# Patient Record
Sex: FEMALE | Race: BLACK OR AFRICAN AMERICAN | NOT HISPANIC OR LATINO | Employment: PART TIME | URBAN - METROPOLITAN AREA
[De-identification: names, ages, dates, MRNs, and addresses within clinical notes are randomized per-mention and may not be internally consistent; named-entity substitution may affect disease eponyms.]

---
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
## 2021-06-17 ENCOUNTER — ANCILLARY PROCEDURE (OUTPATIENT)
Dept: GENERAL RADIOLOGY | Facility: CLINIC | Age: 21
End: 2021-06-17
Attending: NURSE PRACTITIONER
Payer: COMMERCIAL

## 2021-06-17 ENCOUNTER — OFFICE VISIT (OUTPATIENT)
Dept: FAMILY MEDICINE | Facility: CLINIC | Age: 21
End: 2021-06-17
Payer: COMMERCIAL

## 2021-06-17 VITALS
DIASTOLIC BLOOD PRESSURE: 68 MMHG | OXYGEN SATURATION: 100 % | SYSTOLIC BLOOD PRESSURE: 94 MMHG | HEIGHT: 66 IN | HEART RATE: 77 BPM | WEIGHT: 164 LBS | BODY MASS INDEX: 26.36 KG/M2 | TEMPERATURE: 98.4 F

## 2021-06-17 DIAGNOSIS — S69.91XA INJURY OF FINGER, RIGHT, INITIAL ENCOUNTER: ICD-10-CM

## 2021-06-17 DIAGNOSIS — L50.2 URTICARIA DUE TO COLD: ICD-10-CM

## 2021-06-17 DIAGNOSIS — Z11.3 SCREENING FOR STDS (SEXUALLY TRANSMITTED DISEASES): ICD-10-CM

## 2021-06-17 DIAGNOSIS — D72.829 LEUKOCYTOSIS, UNSPECIFIED TYPE: ICD-10-CM

## 2021-06-17 DIAGNOSIS — G89.29 CHRONIC BILATERAL LOW BACK PAIN WITHOUT SCIATICA: ICD-10-CM

## 2021-06-17 DIAGNOSIS — Z30.09 GENERAL COUNSELING AND ADVICE FOR CONTRACEPTIVE MANAGEMENT: ICD-10-CM

## 2021-06-17 DIAGNOSIS — M54.50 CHRONIC BILATERAL LOW BACK PAIN WITHOUT SCIATICA: ICD-10-CM

## 2021-06-17 DIAGNOSIS — Z00.00 ROUTINE GENERAL MEDICAL EXAMINATION AT A HEALTH CARE FACILITY: Primary | ICD-10-CM

## 2021-06-17 LAB
BASOPHILS # BLD AUTO: 0 10E9/L (ref 0–0.2)
BASOPHILS NFR BLD AUTO: 0.2 %
DIFFERENTIAL METHOD BLD: NORMAL
EOSINOPHIL # BLD AUTO: 0.5 10E9/L (ref 0–0.7)
EOSINOPHIL NFR BLD AUTO: 6.2 %
ERYTHROCYTE [DISTWIDTH] IN BLOOD BY AUTOMATED COUNT: 13.2 % (ref 10–15)
HCT VFR BLD AUTO: 39.6 % (ref 35–47)
HGB BLD-MCNC: 13.4 G/DL (ref 11.7–15.7)
LYMPHOCYTES # BLD AUTO: 2.7 10E9/L (ref 0.8–5.3)
LYMPHOCYTES NFR BLD AUTO: 31.5 %
MCH RBC QN AUTO: 30.7 PG (ref 26.5–33)
MCHC RBC AUTO-ENTMCNC: 33.8 G/DL (ref 31.5–36.5)
MCV RBC AUTO: 91 FL (ref 78–100)
MONOCYTES # BLD AUTO: 0.6 10E9/L (ref 0–1.3)
MONOCYTES NFR BLD AUTO: 6.8 %
NEUTROPHILS # BLD AUTO: 4.8 10E9/L (ref 1.6–8.3)
NEUTROPHILS NFR BLD AUTO: 55.3 %
PLATELET # BLD AUTO: 281 10E9/L (ref 150–450)
RBC # BLD AUTO: 4.37 10E12/L (ref 3.8–5.2)
WBC # BLD AUTO: 8.7 10E9/L (ref 4–11)

## 2021-06-17 PROCEDURE — 36415 COLL VENOUS BLD VENIPUNCTURE: CPT | Performed by: NURSE PRACTITIONER

## 2021-06-17 PROCEDURE — 87491 CHLMYD TRACH DNA AMP PROBE: CPT | Performed by: NURSE PRACTITIONER

## 2021-06-17 PROCEDURE — 73140 X-RAY EXAM OF FINGER(S): CPT | Mod: RT | Performed by: RADIOLOGY

## 2021-06-17 PROCEDURE — 99395 PREV VISIT EST AGE 18-39: CPT | Performed by: NURSE PRACTITIONER

## 2021-06-17 PROCEDURE — 87591 N.GONORRHOEAE DNA AMP PROB: CPT | Performed by: NURSE PRACTITIONER

## 2021-06-17 PROCEDURE — 85025 COMPLETE CBC W/AUTO DIFF WBC: CPT | Performed by: NURSE PRACTITIONER

## 2021-06-17 PROCEDURE — 99213 OFFICE O/P EST LOW 20 MIN: CPT | Mod: 25 | Performed by: NURSE PRACTITIONER

## 2021-06-17 RX ORDER — CETIRIZINE HYDROCHLORIDE 10 MG/1
10 TABLET ORAL EVERY EVENING
Qty: 90 TABLET | Refills: 3 | Status: SHIPPED | OUTPATIENT
Start: 2021-06-17

## 2021-06-17 RX ORDER — EPINEPHRINE 0.3 MG/.3ML
0.3 INJECTION SUBCUTANEOUS PRN
Qty: 0.6 ML | Refills: 3 | Status: SHIPPED | OUTPATIENT
Start: 2021-06-17 | End: 2024-05-06

## 2021-06-17 RX ORDER — CETIRIZINE HYDROCHLORIDE 10 MG/1
10 TABLET ORAL EVERY EVENING
Qty: 90 TABLET | Refills: 3 | Status: CANCELLED | OUTPATIENT
Start: 2021-06-17

## 2021-06-17 RX ORDER — EPINEPHRINE 0.3 MG/.3ML
0.3 INJECTION SUBCUTANEOUS PRN
Qty: 0.6 ML | Refills: 3 | Status: CANCELLED | OUTPATIENT
Start: 2021-06-17

## 2021-06-17 ASSESSMENT — ENCOUNTER SYMPTOMS
DIARRHEA: 0
HEMATURIA: 0
CONSTIPATION: 1
COUGH: 0
SORE THROAT: 0
PALPITATIONS: 0
HEADACHES: 0
DIZZINESS: 0
HEARTBURN: 0
EYE PAIN: 0
WEAKNESS: 0
FREQUENCY: 0
MYALGIAS: 1
HEMATOCHEZIA: 0
NERVOUS/ANXIOUS: 0
ABDOMINAL PAIN: 0
CHILLS: 1
ARTHRALGIAS: 1
FEVER: 0
PARESTHESIAS: 0
NAUSEA: 0
DYSURIA: 0
JOINT SWELLING: 0
BREAST MASS: 0
SHORTNESS OF BREATH: 1

## 2021-06-17 ASSESSMENT — MIFFLIN-ST. JEOR: SCORE: 1526.65

## 2021-06-17 NOTE — PROGRESS NOTES
SUBJECTIVE:   CC: Keshia Orellana is an 21 year old woman who presents for preventive health visit.       Patient has been advised of split billing requirements and indicates understanding: Yes  Healthy Habits:     Getting at least 3 servings of Calcium per day:  NO    Bi-annual eye exam:  NO    Dental care twice a year:  NO    Sleep apnea or symptoms of sleep apnea:  None    Diet:  Other    Frequency of exercise:  2-3 days/week    Duration of exercise:  Less than 15 minutes    Taking medications regularly:  Yes    Medication side effects:  None    PHQ-2 Total Score: 0    Additional concerns today:  Yes (birth control, right ring finger)    -Punched wall over 6 months ago and finger is bent- has pain when bumping it.  -White blood cell count elevated in Emergency Room months ago, worried as father as leukemia  -Low back painful for months, mostly when bending over      Today's PHQ-2 Score:   PHQ-2 ( 1999 Pfizer) 6/17/2021   Q1: Little interest or pleasure in doing things 0   Q2: Feeling down, depressed or hopeless 0   PHQ-2 Score 0   Q1: Little interest or pleasure in doing things Not at all   Q2: Feeling down, depressed or hopeless Not at all   PHQ-2 Score 0       Abuse: Current or Past (Physical, Sexual or Emotional) - No  Do you feel safe in your environment? Yes    Have you ever done Advance Care Planning? (For example, a Health Directive, POLST, or a discussion with a medical provider or your loved ones about your wishes): No, advance care planning information given to patient to review.  Patient declined advance care planning discussion at this time.    Social History     Tobacco Use     Smoking status: Never Smoker     Smokeless tobacco: Never Used   Substance Use Topics     Alcohol use: No     Alcohol/week: 0.0 standard drinks     If you drink alcohol do you typically have >3 drinks per day or >7 drinks per week? No    Alcohol Use 6/17/2021   Prescreen: >3 drinks/day or >7 drinks/week? Not Applicable  "      Reviewed orders with patient.  Reviewed health maintenance and updated orders accordingly - Yes  Labs reviewed in EPIC    Breast Cancer Screening:        History of abnormal Pap smear: NO - under age 21, PAP not appropriate for age     Reviewed and updated as needed this visit by clinical staff  Tobacco  Allergies  Meds   Med Hx  Surg Hx  Fam Hx  Soc Hx        Reviewed and updated as needed this visit by Provider                    Review of Systems   Constitutional: Positive for chills. Negative for fever.   HENT: Positive for hearing loss. Negative for congestion, ear pain and sore throat.    Eyes: Positive for visual disturbance. Negative for pain.   Respiratory: Positive for shortness of breath. Negative for cough.    Cardiovascular: Negative for chest pain, palpitations and peripheral edema.   Gastrointestinal: Positive for constipation. Negative for abdominal pain, diarrhea, heartburn, hematochezia and nausea.   Breasts:  Negative for tenderness, breast mass and discharge.   Genitourinary: Negative for dysuria, frequency, genital sores, hematuria, pelvic pain, urgency, vaginal bleeding and vaginal discharge.   Musculoskeletal: Positive for arthralgias and myalgias. Negative for joint swelling.   Skin: Negative for rash.   Neurological: Negative for dizziness, weakness, headaches and paresthesias.   Psychiatric/Behavioral: Negative for mood changes. The patient is not nervous/anxious.         OBJECTIVE:   BP 94/68 (BP Location: Right arm, Patient Position: Chair, Cuff Size: Adult Regular)   Pulse 77   Temp 98.4  F (36.9  C) (Oral)   Ht 1.67 m (5' 5.75\")   Wt 74.4 kg (164 lb)   SpO2 100%   Breastfeeding No   BMI 26.67 kg/m    Physical Exam  GENERAL: healthy, alert and no distress  EYES: Eyes grossly normal to inspection, PERRL and conjunctivae and sclerae normal  HENT: ear canals and TM's normal, nose and mouth without ulcers or lesions  NECK: no adenopathy, no asymmetry, masses, or scars and " thyroid normal to palpation  RESP: lungs clear to auscultation - no rales, rhonchi or wheezes  BREAST: normal without masses, tenderness or nipple discharge and no palpable axillary masses or adenopathy  CV: regular rate and rhythm, normal S1 S2, no S3 or S4, no murmur, click or rub, no peripheral edema and peripheral pulses strong  ABDOMEN: soft, nontender, no hepatosplenomegaly, no masses and bowel sounds normal  MS: Right 4th finger flexed at DIP, unable to actively extend DIP, can move into proper alignment with passive ROM  Tenderness lumbar spine and bilateral Paraspinous muscles  SKIN: no suspicious lesions or rashes  NEURO: Normal strength and tone, mentation intact and speech normal  PSYCH: mentation appears normal, affect normal/bright    Diagnostic Test Results:  Labs reviewed in Epic  No results found for this or any previous visit (from the past 24 hour(s)).    ASSESSMENT/PLAN:   1. Routine general medical examination at a health care facility      2. Screening for STDs (sexually transmitted diseases)    - NEISSERIA GONORRHOEA PCR  - CHLAMYDIA TRACHOMATIS PCR    3. Injury of finger, right, initial encounter  Old fracture vs tendon injury- plain films today and follow up with ortho  - XR Finger Right G/E 2 Views; Future  - Orthopedic  Referral; Future    4. Leukocytosis, unspecified type    - CBC with platelets and differential    5. Chronic bilateral low back pain without sciatica    - [facility] REFERR[facility]; Future    6. Urticaria due to cold    - EPINEPHrine (ANY BX GENERIC EQUIV) 0.3 MG/0.3ML injection 2-pack; Inject 0.3 mLs (0.3 mg) into the muscle as needed for anaphylaxis  Dispense: 0.6 mL; Refill: 3  - cetirizine (ZYRTEC) 10 MG tablet; Take 1 tablet (10 mg) by mouth every evening  Dispense: 90 tablet; Refill: 3    7. General counseling and advice for contraceptive management  Patient is interested in proceeding with Mirena IUD insertion. Counseled patient regarding risks of Mirena  "insertion including perforation of uterus, migration of IUD outside of the uterus requiring surgical removal, and infection. Additional complications can include the IUD adhering to the wall of the uterus, spontaneous expulsion of the IUD, pelvic pain or cramping, and irregular menstrual bleeding pattern. Advantages of the IUD include 7 years of contraception, lighter periods, and immediate return to fertility after removal. Patient will follow up in clinic around the time of her next menses for IUD insertion. She is instructed to take 600 mg of Ibuprofen 30 minutes prior to her scheduled appointment.      Patient has been advised of split billing requirements and indicates understanding: No  COUNSELING:  Reviewed preventive health counseling, as reflected in patient instructions       Regular exercise       Healthy diet/nutrition       Contraception       Osteoporosis prevention/bone health       Consider Hep C screening for all patients one time for ages 18-79 years       HIV screeninx in teen years, 1x in adult years, and at intervals if high risk    Estimated body mass index is 26.67 kg/m  as calculated from the following:    Height as of this encounter: 1.67 m (5' 5.75\").    Weight as of this encounter: 74.4 kg (164 lb).    Weight management plan: Discussed healthy diet and exercise guidelines    She reports that she has never smoked. She has never used smokeless tobacco.      Counseling Resources:  ATP IV Guidelines  Pooled Cohorts Equation Calculator  Breast Cancer Risk Calculator  BRCA-Related Cancer Risk Assessment: FHS-7 Tool  FRAX Risk Assessment  ICSI Preventive Guidelines  Dietary Guidelines for Americans,   USDA's MyPlate  ASA Prophylaxis  Lung CA Screening    ERON [facility]  "

## 2021-06-17 NOTE — PATIENT INSTRUCTIONS
We are now scheduling all people age 12 and older for COVID-19 vaccines (patients age 12-17 can only  the Pfizer vaccine).     To schedule your appointment please log in to "[employer] using this link to see when and where we have openings. Appointments open up throughout the week, check back for additional openings.     If there are no appointments left, you will be unable to schedule. If you have technical difficulty using "[employer] call [phone] for assistance.  If you would like to be added to our standby list, do that on our website: here.    Patients 12-17 years old must schedule their appointment at a location offering the Pfizer vaccine.  First dose Pfizer vaccines are available at the following sites with convenient hours, including Saturday appointments:    [facility] (former clinic, now serving as a vaccination-only site)    [facility]    Appointments for  Moderna and Edgardo (Abdelrahman& Abdelrahman) COVID-19 Vaccines for those 18 years and older is available at many locations in our system.  More information is on our website: [email or web address]. Check this website to stay up to date on COVID-19 vaccination information      Preventive Health Recommendations  Female Ages 18 to 20     Yearly exam:     See your health care provider every year in order to  o Review health changes.   o Discuss preventive care.    o Review your medicines if your doctor has prescribed any.      You should be tested each year for STDs (sexually transmitted diseases).       After age 20, talk to your provider about how often you should have cholesterol testing.      If you are at risk for diabetes, you should have a diabetes test (fasting glucose).     Shots:     Get a flu shot each year.     Get a tetanus shot every 10 years.     Consider getting the shot  (vaccine) that prevents cervical cancer (Gardasil).    Nutrition:     Eat at least 5 servings of fruits and vegetables each day.    Eat whole-grain bread, whole-wheat pasta and brown rice instead of white grains and rice.    Get adequate Calcium and Vitamin D.     Lifestyle    Exercise at least 150 minutes a week each week (30 minutes a day, 5 days a week). This will help you control your weight and prevent disease.    No smoking.     Wear sunscreen to prevent skin cancer.    See your dentist every six months for an exam and cleaning.

## 2021-06-21 ENCOUNTER — TELEPHONE (OUTPATIENT)
Dept: FAMILY MEDICINE | Facility: CLINIC | Age: 21
End: 2021-06-21

## 2021-06-21 NOTE — TELEPHONE ENCOUNTER
Called patient. Left voice message to return call at [phone].      Written by Jenifer Grady APRN CNP on 6/18/2021  4:12 PM  Nima Schmitt,   Here are your recent results.   -Chlamydia and gonnohrea tests are normal.       Jenifer Grady, CNP

## 2021-06-22 NOTE — PROGRESS NOTES
SUBJECTIVE:   Keshia Orellana is a 21 year old female who is seen in consultation at the request of Jenifer Grady CNP for evaluation of right IV digit  finger pain.   History of injury: punched a wall 6 months ago.  Hasn't been able to straighten end of the finger since.  Pain since.  Impairs her bowling.  Pain at DIP  She had an xray last week. Bumped it on a door a couple of days ago (after the xray) with increased pain.    Treatments tried to this point: none    Review of Systems:  Constitutional:  NEGATIVE for fever, chills, change in weight  Integumentary/Skin:  NEGATIVE for worrisome rashes, moles or lesions  Eyes:  NEGATIVE for vision changes or irritation  ENT/Mouth:  NEGATIVE for ear, mouth and throat problems  Resp:  NEGATIVE for significant cough or SOB  Breast:  NEGATIVE for masses, tenderness or discharge  CV:  NEGATIVE for chest pain, palpitations or peripheral edema  GI:  NEGATIVE for nausea, abdominal pain, heartburn, or change in bowel habits  :  Negative   Musculoskeletal:  See HPI above  Neuro:  NEGATIVE for weakness, dizziness or paresthesias  Endocrine:  NEGATIVE for temperature intolerance, skin/hair changes  Heme/allergy/immune:  NEGATIVE for bleeding problems  Psychiatric:  NEGATIVE for changes in mood or affect    Past Medical History:   Past Medical History:   Diagnosis Date     Chlamydia 2018, 2018, 2019     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     Past Surgical History:   Past Surgical History:   Procedure Laterality Date     HC INSERT IMPLANTABLE CONTRACEPTIVE CAPSULE      Nexplanon     HC REMOVAL IMPLATABLE CONTRACEPTIVE CAPSULE       Family History:   Family History   Problem Relation Age of Onset     Family History Negative Other      Leukemia Father      Diabetes No family hx of      Coronary Artery Disease No family hx of      Hypertension No family hx of      Breast Cancer No family hx of      Colon Cancer No family hx of      Social History:   Social  "History     Tobacco Use     Smoking status: Never Smoker     Smokeless tobacco: Never Used   Substance Use Topics     Alcohol use: No     Alcohol/week: 0.0 standard drinks       OBJECTIVE:  Physical Exam:  BP 95/61 (BP Location: Left arm, Patient Position: Sitting, Cuff Size: Adult Regular)   Pulse 86   Ht 1.721 m (5' 7.75\")   Wt 74.4 kg (164 lb)   LMP 06/01/2021 (Approximate)   SpO2 100%   BMI 25.12 kg/m    General Appearance: healthy, alert and no distress   Skin: no suspicious lesions or rashes  Neuro: Normal strength and tone, mentation intact and speech normal  Vascular: good pulses, and cappillary refill   Lymph: no lymphadenopathy   Psych:  mentation appears normal and affect normal/bright  Resp: no increased work of breathing     Right Hand Exam:  Inspection: some swelling of DIP.  Early swan-neck deformity  ROM: able to make a full fist  Right 4th finger flexed at DIP, unable to actively fully extend DIP, can move into proper alignment with passive ROM  Tender: mildly at DIP.     X-rays:  Obtained 6/17/21 , reviewed in the office with the patient today demonstrate   1.  Healed fracture deformity involving the dorsal base of the distal  phalanx of the right fourth finger (adjacent to the DIP joint). No  ununited or malunited component. Maintained normal joint alignment and  spacing.  2.  The remainder of the fourth finger and visualized hand are  unremarkable.       ASSESSMENT:   Encounter Diagnoses   Name Primary?     Mallet finger of right hand right 4th finger. Yes     Injury of finger, right, initial encounter    I think this is essentially a soft tissue mallet finger, despite the healed fracture. There is no bony avulsion evident.    PLAN:   Stack splint given.  Taught don/doff  PIP range of motion.  Follow up 8 weeks for re-check.    MECHE Dowd MD  Dept. Orthopedic Surgery  [facility]   "

## 2021-06-23 ENCOUNTER — OFFICE VISIT (OUTPATIENT)
Dept: ORTHOPEDICS | Facility: CLINIC | Age: 21
End: 2021-06-23
Attending: NURSE PRACTITIONER
Payer: COMMERCIAL

## 2021-06-23 VITALS
HEART RATE: 86 BPM | DIASTOLIC BLOOD PRESSURE: 61 MMHG | HEIGHT: 68 IN | WEIGHT: 164 LBS | SYSTOLIC BLOOD PRESSURE: 95 MMHG | OXYGEN SATURATION: 100 % | BODY MASS INDEX: 24.86 KG/M2

## 2021-06-23 DIAGNOSIS — S69.91XA INJURY OF FINGER, RIGHT, INITIAL ENCOUNTER: ICD-10-CM

## 2021-06-23 DIAGNOSIS — M20.011 MALLET FINGER OF RIGHT HAND: Primary | ICD-10-CM

## 2021-06-23 PROCEDURE — 99243 OFF/OP CNSLTJ NEW/EST LOW 30: CPT | Performed by: ORTHOPAEDIC SURGERY

## 2021-06-23 ASSESSMENT — MIFFLIN-ST. JEOR: SCORE: 1558.43

## 2021-06-23 ASSESSMENT — PAIN SCALES - GENERAL: PAINLEVEL: SEVERE PAIN (7)

## 2021-06-23 NOTE — LETTER
6/23/2021         RE: Keshia Orellana  [address] [place] [id number]        Dear Colleague,    Thank you for referring your patient, Keshia Orellana, to the [facility]. Please see a copy of my visit note below.    SUBJECTIVE:   Keshia Orellana is a 21 year old female who is seen in consultation at the request of Jenifer Grady CNP for evaluation of right IV digit  finger pain.   History of injury: punched a wall 6 months ago.  Hasn't been able to straighten end of the finger since.  Pain since.  Impairs her bowling.  Pain at DIP  She had an xray last week. Bumped it on a door a couple of days ago (after the xray) with increased pain.    Treatments tried to this point: none    Review of Systems:  Constitutional:  NEGATIVE for fever, chills, change in weight  Integumentary/Skin:  NEGATIVE for worrisome rashes, moles or lesions  Eyes:  NEGATIVE for vision changes or irritation  ENT/Mouth:  NEGATIVE for ear, mouth and throat problems  Resp:  NEGATIVE for significant cough or SOB  Breast:  NEGATIVE for masses, tenderness or discharge  CV:  NEGATIVE for chest pain, palpitations or peripheral edema  GI:  NEGATIVE for nausea, abdominal pain, heartburn, or change in bowel habits  :  Negative   Musculoskeletal:  See HPI above  Neuro:  NEGATIVE for weakness, dizziness or paresthesias  Endocrine:  NEGATIVE for temperature intolerance, skin/hair changes  Heme/allergy/immune:  NEGATIVE for bleeding problems  Psychiatric:  NEGATIVE for changes in mood or affect    Past Medical History:   Past Medical History:   Diagnosis Date     Chlamydia 2018, 2018, 2019     Nkoz-Pqye-Adoybp syndrome due to chlamydia trachomatis 5/21/2018     Past Surgical History:   Past Surgical History:   Procedure Laterality Date     HC INSERT IMPLANTABLE CONTRACEPTIVE CAPSULE      Nexplanon     HC REMOVAL IMPLATABLE CONTRACEPTIVE CAPSULE       Family History:   Family History   Problem Relation Age of Onset     Family History  "Negative Other      Leukemia Father      Diabetes No family hx of      Coronary Artery Disease No family hx of      Hypertension No family hx of      Breast Cancer No family hx of      Colon Cancer No family hx of      Social History:   Social History     Tobacco Use     Smoking status: Never Smoker     Smokeless tobacco: Never Used   Substance Use Topics     Alcohol use: No     Alcohol/week: 0.0 standard drinks       OBJECTIVE:  Physical Exam:  BP 95/61 (BP Location: Left arm, Patient Position: Sitting, Cuff Size: Adult Regular)   Pulse 86   Ht 1.721 m (5' 7.75\")   Wt 74.4 kg (164 lb)   LMP 06/01/2021 (Approximate)   SpO2 100%   BMI 25.12 kg/m    General Appearance: healthy, alert and no distress   Skin: no suspicious lesions or rashes  Neuro: Normal strength and tone, mentation intact and speech normal  Vascular: good pulses, and cappillary refill   Lymph: no lymphadenopathy   Psych:  mentation appears normal and affect normal/bright  Resp: no increased work of breathing     Right Hand Exam:  Inspection: some swelling of DIP.  Early swan-neck deformity  ROM: able to make a full fist  Right 4th finger flexed at DIP, unable to actively fully extend DIP, can move into proper alignment with passive ROM  Tender: mildly at DIP.     X-rays:  Obtained 6/17/21 , reviewed in the office with the patient today demonstrate   1.  Healed fracture deformity involving the dorsal base of the distal  phalanx of the right fourth finger (adjacent to the DIP joint). No  ununited or malunited component. Maintained normal joint alignment and  spacing.  2.  The remainder of the fourth finger and visualized hand are  unremarkable.       ASSESSMENT:   Encounter Diagnoses   Name Primary?     Mallet finger of right hand right 4th finger. Yes     Injury of finger, right, initial encounter    I think this is essentially a soft tissue mallet finger, despite the healed fracture. There is no bony avulsion evident.    PLAN:   Stack splint " given.  Taught don/doff  PIP range of motion.  Follow up 8 weeks for re-check.    MECHE Dowd MD  Dept. Orthopedic Surgery  [facility]       Again, thank you for allowing me to participate in the care of your patient.        Sincerely,        Pedro Dowd MD

## 2021-07-14 ENCOUNTER — TELEPHONE (OUTPATIENT)
Dept: FAMILY MEDICINE | Facility: CLINIC | Age: 21
End: 2021-07-14

## 2021-07-14 NOTE — TELEPHONE ENCOUNTER
Reason for call:  Other   Patient called regarding (reason for call): call back    Additional comments: per patient , she will like to come in and have an IUD placement.    Phone number to reach patient:  Home number on file [phone] (home)    Best Time:  Anytime     Can we leave a detailed message on this number?  NO    Travel screening: Not Applicable

## 2021-08-02 NOTE — TELEPHONE ENCOUNTER
Pt would like to david her IUD. (not sure if you need a procedure room).  Please call. [phone].

## 2021-08-03 NOTE — TELEPHONE ENCOUNTER
Called and left a message for the pt to call the clinic back and reschedule her IUD.  Ya Snell MA

## 2021-09-09 ENCOUNTER — OFFICE VISIT (OUTPATIENT)
Dept: FAMILY MEDICINE | Facility: CLINIC | Age: 21
End: 2021-09-09
Payer: COMMERCIAL

## 2021-09-09 VITALS
OXYGEN SATURATION: 96 % | TEMPERATURE: 98.2 F | WEIGHT: 176 LBS | BODY MASS INDEX: 26.67 KG/M2 | HEIGHT: 68 IN | DIASTOLIC BLOOD PRESSURE: 69 MMHG | SYSTOLIC BLOOD PRESSURE: 109 MMHG | HEART RATE: 90 BPM

## 2021-09-09 DIAGNOSIS — Z30.430 ENCOUNTER FOR INSERTION OF INTRAUTERINE CONTRACEPTIVE DEVICE: Primary | ICD-10-CM

## 2021-09-09 LAB — HCG UR QL: NEGATIVE

## 2021-09-09 PROCEDURE — 58300 INSERT INTRAUTERINE DEVICE: CPT | Performed by: NURSE PRACTITIONER

## 2021-09-09 PROCEDURE — 81025 URINE PREGNANCY TEST: CPT | Performed by: NURSE PRACTITIONER

## 2021-09-09 RX ORDER — IBUPROFEN 200 MG
600 TABLET ORAL ONCE
Status: COMPLETED | OUTPATIENT
Start: 2021-09-09 | End: 2021-09-09

## 2021-09-09 RX ADMIN — Medication 600 MG: at 14:00

## 2021-09-09 ASSESSMENT — MIFFLIN-ST. JEOR: SCORE: 1612.86

## 2021-09-09 NOTE — NURSING NOTE
Clinic Administered Medication Documentation    Administrations This Visit     levonorgestrel (MIRENA) 20 MCG/24HR IUD 20 mcg     Admin Date  09/09/2021 Action  Given Dose  20 mcg Route  Intrauterine Site   Administered By  Gris Diez CMA    Ordering Provider: Jenifer Grady APRN CNP    NDC: 46631-137-95    Lot#: [id number]    : Androcial    Patient Supplied?: No                  Intrauterine/Implant Insertion Documentation    Device was placed by provider (please see MAR for given by information). Please see MAR and medication order for additional information.     Type: Mirena  Remove/Replace by: 09/09/2027    Expiration Date:  06/01/2023  Gris Diez CMA

## 2021-09-09 NOTE — PROGRESS NOTES
"  IUD Insertion:  CONSULT:    Is a pregnancy test required: Yes.  Was it positive or negative?  Negative  Was a consent obtained?  Yes    Subjective: Keshia Orellana is a 21 year old  presents for IUD and desires Mirena type IUD.    Patient has been given the opportunity to ask questions about all forms of birth control, including all options appropriate for Keshia Orellana. Discussed that no method of birth control, except abstinence is 100% effective against pregnancy or sexually transmitted infection.     Keshia Orellana understands she may have the IUD removed at any time. IUD should be removed by a health care provider.    The entire insertion procedure was reviewed with the patient, including care after placement.    Patient's last menstrual period was 2021 (exact date). Last sexual activity: prior to LMP. No allergy to betadine or shellfish. Patient declines STD screening  HCG Qual Urine   Date Value Ref Range Status   2020 Positive (A) NEG^Negative Final     Comment:     This test is for screening purposes.  Results should be interpreted along with   the clinical picture.  Confirmation testing is available if warranted by   ordering PRP645, HCG Quantitative Pregnancy.       hCG Urine Qualitative   Date Value Ref Range Status   2021 Negative Negative Final     Comment:     This test is for screening purposes.  Results should be interpreted along with the clinical picture.  Confirmation testing is available if warranted by ordering [id number], HCG Quantitative Pregnancy.         /69   Pulse 90   Temp 98.2  F (36.8  C) (Oral)   Ht 1.721 m (5' 7.75\")   Wt 79.8 kg (176 lb)   LMP 2021 (Exact Date)   SpO2 96%   BMI 26.96 kg/m      Pelvic Exam:   EG/BUS: normal genital architecture without lesions, erythema or abnormal secretions.   Vagina: moist, pink, rugae with physiologic discharge and secretions  Cervix: multiparous no lesions and pink, moist, closed, without lesion or " CMT  Uterus: midlineposition, mobile, no pain  Adnexa: within normal limits and no masses, nodularity, tenderness    PROCEDURE NOTE: -- IUD Insertion    Reason for Insertion: contraception    Premedicated with ibuprofen.  Under sterile technique, cervix was visualized with speculum and prepped with Betadine solution swab x 3. Tenaculum was placed for stability. The uterus was gently straightened and sounded to 8.0 cm. IUD prepared for placement, and IUD inserted according to 's instructions without difficulty or significant resitance, and deployed at the fundus. The strings were visualized and trimmed to 5.0 cm from the external os. Tenaculum was removed and hemostasis noted. Speculum removed.  Patient tolerated procedure well.    Lot # [id number]  Exp: 06/01/2023    EBL: minimal    Complications: none    ASSESSMENT:     ICD-10-CM    1. Encounter for insertion of intrauterine contraceptive device  Z30.430 levonorgestrel (MIRENA) 20 MCG/24HR IUD     levonorgestrel (MIRENA) 20 MCG/24HR IUD 20 mcg     INSERTION INTRAUTERINE DEVICE     HCG Qual, Urine ([id number])        PLAN:    Given 's handouts, including when to have IUD removed, list of danger s/sx, side effects and follow up recommended. Encouraged condom use for prevention of STD. Back up contraception advised for 7 days if progestin method. Advised to call for any fever, for prolonged or severe pain or bleeding, abnormal vaginal discharge, or unable to palpate strings. She was advised to use pain medications (ibuprofen) as needed for mild to moderate pain. Advised to follow-up in clinic in 4-6 weeks for IUD string check if unable to find strings or as directed by provider.     ERON Brush CNP

## 2021-10-03 ENCOUNTER — HEALTH MAINTENANCE LETTER (OUTPATIENT)
Age: 21
End: 2021-10-03

## 2022-09-10 ENCOUNTER — HEALTH MAINTENANCE LETTER (OUTPATIENT)
Age: 22
End: 2022-09-10

## 2023-10-01 ENCOUNTER — HEALTH MAINTENANCE LETTER (OUTPATIENT)
Age: 23
End: 2023-10-01

## 2024-04-01 ENCOUNTER — OFFICE VISIT (OUTPATIENT)
Dept: FAMILY MEDICINE | Facility: CLINIC | Age: 24
End: 2024-04-01
Payer: MEDICAID

## 2024-04-01 VITALS
HEART RATE: 97 BPM | WEIGHT: 170.2 LBS | SYSTOLIC BLOOD PRESSURE: 104 MMHG | TEMPERATURE: 98 F | DIASTOLIC BLOOD PRESSURE: 60 MMHG | RESPIRATION RATE: 18 BRPM | HEIGHT: 65 IN | BODY MASS INDEX: 28.36 KG/M2 | OXYGEN SATURATION: 97 %

## 2024-04-01 DIAGNOSIS — L02.419 CUTANEOUS ABSCESS OF AXILLA, UNSPECIFIED LATERALITY: ICD-10-CM

## 2024-04-01 DIAGNOSIS — S93.402A SPRAIN OF LEFT ANKLE, UNSPECIFIED LIGAMENT, INITIAL ENCOUNTER: ICD-10-CM

## 2024-04-01 DIAGNOSIS — B36.0 TINEA VERSICOLOR: Primary | ICD-10-CM

## 2024-04-01 PROCEDURE — 99214 OFFICE O/P EST MOD 30 MIN: CPT | Performed by: NURSE PRACTITIONER

## 2024-04-01 RX ORDER — FLUCONAZOLE 150 MG/1
300 TABLET ORAL WEEKLY
Qty: 4 TABLET | Refills: 0 | Status: SHIPPED | OUTPATIENT
Start: 2024-04-01

## 2024-04-01 RX ORDER — SULFAMETHOXAZOLE/TRIMETHOPRIM 800-160 MG
1 TABLET ORAL 2 TIMES DAILY
Qty: 14 TABLET | Refills: 0 | Status: SHIPPED | OUTPATIENT
Start: 2024-04-01

## 2024-04-01 RX ORDER — KETOCONAZOLE 20 MG/ML
SHAMPOO TOPICAL
Qty: 120 ML | Refills: 3 | Status: SHIPPED | OUTPATIENT
Start: 2024-04-01 | End: 2024-05-06

## 2024-04-01 RX ORDER — EPINEPHRINE 0.3 MG/.3ML
0.3 INJECTION SUBCUTANEOUS PRN
Qty: 0.6 ML | Refills: 3 | Status: CANCELLED | OUTPATIENT
Start: 2024-04-01

## 2024-04-01 ASSESSMENT — PAIN SCALES - GENERAL: PAINLEVEL: SEVERE PAIN (7)

## 2024-04-01 NOTE — PATIENT INSTRUCTIONS
You can apply a warm compress to the areas in your armpits.  Please follow-up with your primary care provider if these do not completely resolve after taking the antibiotic.    Please take the antifungal medication-Diflucan/fluconazole as directed and use the ketoconazole shampoo to your skin.  As discussed in clinic it is common for this rash to get better and then come back.  You can use the ketoconazole shampoo in the future as needed.    Physical therapy should be reaching out to you to assist you in getting scheduled for therapy to strengthen your left ankle.

## 2024-04-01 NOTE — PROGRESS NOTES
"  Assessment & Plan     Tinea versicolor  Educated on use of medication.  Informed that may get better and then return in the future.  May continue to use ketoconazole shampoo in the future as needed.  - Physical Therapy  Referral; Future  - fluconazole (DIFLUCAN) 150 MG tablet; Take 2 tablets (300 mg) by mouth once a week  - ketoconazole (NIZORAL) 2 % external shampoo; Apply to body.  Leave on for 5 minutes then rinse.  Do this daily for 2 weeks.    Cutaneous abscess of axilla, unspecified laterality  Warm moist pack  Educated on use of antibiotic.  Follow-up if no improvement after completing the antibiotic.  - sulfamethoxazole-trimethoprim (BACTRIM DS) 800-160 MG tablet; Take 1 tablet by mouth 2 times daily    Sprain of left ankle, unspecified ligament, initial encounter  - Physical Therapy  Referral; Future      BMI  Estimated body mass index is 28.32 kg/m  as calculated from the following:    Height as of this encounter: 1.651 m (5' 5\").    Weight as of this encounter: 77.2 kg (170 lb 3.2 oz).         Josh Schmitt is a 24 year old, presenting for the following health issues:  Derm Problem (Has developed dry spots/patches on skin in past couple weeks. Also has boils under both armpits that have been persistent for a while and not subsided)        4/1/2024     2:56 PM   Additional Questions   Roomed by Olga HAIRSTON   Accompanied by Self         4/1/2024     2:56 PM   Patient Reported Additional Medications   Patient reports taking the following new medications None     History of Present Illness       Reason for visit:  New occuring dry spots through entire body  Symptom onset:  3-4 weeks ago  Symptoms include:  Dry spots itchiness  Symptom intensity:  Mild  Symptom progression:  Staying the same  Had these symptoms before:  No  What makes it worse:  They are not getting better  What makes it better:  Hot shower    She eats 0-1 servings of fruits and vegetables daily.She consumes 0 sweetened " "beverage(s) daily.She exercises with enough effort to increase her heart rate 20 to 29 minutes per day.  She exercises with enough effort to increase her heart rate 7 days per week.   She is taking medications regularly.       Rash  Onset/Duration: About 3 weeks ago, when she started new job    Description  Location: Throughout entire body  Character: flakey, burning, itchy  Itching: mild  Intensity:  mild  Progression of Symptoms:  same  Accompanying signs and symptoms:   Fever: No  Body aches or joint pain: No  Sore throat symptoms: No  Recent cold symptoms: No  History:           Previous episodes of similar rash: None  New exposures:  None and job change  Recent travel: No  Exposure to similar rash: No  Precipitating or alleviating factors: N/A  Therapies tried and outcome: Aquaphor which seemed to help very minor      Has a rash.  Started about 3 weeks ago.  Rash looks like spots.They itch sometimes. No recent travel. No SOB diffulty breathing or wheezing.  Did use some Aquafor and does feel like that helped some.  Works at a -this is new for her.     Left ankle-has sprained many times.  1 month ago was skipping down stairs and missed the last 2 steps.. Yesterday rolled ankle again on the edge of sidewalk into grass. No numbness or tingling. Feels swollen. Hurts to walk. No home over the counter treatments.  No previous fractures.    Boils to bilat arm pits. Will get them and then they will go away but then they come back.  This time they are not going away on their own.  Do not open up or drain. Does not use deoderant. Has had them to right armpit for 2 months left armpit for 3 weeks. They are painful. Does not get them under breasts or in groin.         Objective    /60 (BP Location: Left arm, Patient Position: Sitting, Cuff Size: Adult Regular)   Pulse 97   Temp 98  F (36.7  C) (Temporal)   Resp 18   Ht 1.651 m (5' 5\")   Wt 77.2 kg (170 lb 3.2 oz)   LMP 03/27/2024 (Approximate)   SpO2 97% "   BMI 28.32 kg/m    Body mass index is 28.32 kg/m .  Physical Exam  Constitutional:       Appearance: She is well-developed.   Cardiovascular:      Rate and Rhythm: Normal rate and regular rhythm.   Pulmonary:      Effort: Pulmonary effort is normal.      Breath sounds: Normal breath sounds.   Musculoskeletal:      Left ankle: Swelling present. No deformity or lacerations. Tenderness present over the lateral malleolus. Decreased range of motion.   Skin:     General: Skin is warm.          Neurological:      Mental Status: She is alert.   Psychiatric:         Mood and Affect: Mood normal.                Signed Electronically by: ERON Gates CNP

## 2024-04-14 NOTE — PROGRESS NOTES
Keshia Orellana is a 17 year old female  Patient's last menstrual period was 2017 (approximate).     I previously reviewed options regarding contraception, and again today reviewed the Nexplanon as a sub-dermal implant effectiveness for 3 years.     /68 (BP Location: Left arm, Cuff Size: Adult Regular)  Pulse 80  Wt 157 lb 9.6 oz (71.5 kg)  LMP 2017 (Approximate)  SpO2 98%  Breastfeeding? No     We reviewed the mechanism of actions, goals and limitations of the use of the medication, as well as the contraindications.  Bleeding patterns were also reviewed with her. She voiced her understanding.  The patient and I reviewed Nexplanon removal, and should she choose to have the product removed, she needs to have someone trained for the insertion and removal.  Informed consent obtained.    Patient was placed in a supine position. Left arm was flexed at the elbow and externally rotated. Insertion site was noted at 6 cm above the elbow crease at the inner side of the upper arm overlying the grove between the biceps and the triceps. A second deepika was made 6 cm from the first to use as a guide. The area of the insertion site was prepped with Betadine. Lidocaine 1% was used along the planned insertion site. The Nexplanon was removed from its packaging.   The Nexplanon applicator was held just above the needle at the textured surface area and the transparent protection cap was removed from the needle. The implant could be seen by looking into the tip of the needle. The implant could be seen by looking into the tip of the needle.  The skin was stretched at the insertion site. The needle was inserted with beveled side up just underneath the skin. Tenting was undertaken while the insertion needle to its full length. The purple slider was unlocked. The slider was moved fully back until it stopped.  The applicator was removed from her arm.  After the insertion, the implant was palpated by both myself and  the patient. The betadine was removed from her arm. Benzoin and Steri Strips were applied.  Telfa was applied to site, along with pressure dressing and sterile gauze.  The patient was given aftercare instructions, her user card with the lot number. She tolerated the procedure well.  No apparent complications.      Lot#:[id number]  Expiration date:07/2017  NDC 1286-2639-73    Estiven Casarez MD     Patient/Caregiver provided printed discharge information.

## 2024-05-06 ENCOUNTER — OFFICE VISIT (OUTPATIENT)
Dept: FAMILY MEDICINE | Facility: CLINIC | Age: 24
End: 2024-05-06
Payer: MEDICAID

## 2024-05-06 VITALS
HEART RATE: 93 BPM | OXYGEN SATURATION: 100 % | WEIGHT: 169.2 LBS | TEMPERATURE: 99.1 F | DIASTOLIC BLOOD PRESSURE: 63 MMHG | SYSTOLIC BLOOD PRESSURE: 94 MMHG | BODY MASS INDEX: 28.19 KG/M2 | HEIGHT: 65 IN | RESPIRATION RATE: 18 BRPM

## 2024-05-06 DIAGNOSIS — Z11.3 SCREENING FOR STDS (SEXUALLY TRANSMITTED DISEASES): ICD-10-CM

## 2024-05-06 DIAGNOSIS — L50.2 URTICARIA DUE TO COLD: ICD-10-CM

## 2024-05-06 DIAGNOSIS — B36.0 TINEA VERSICOLOR: Primary | ICD-10-CM

## 2024-05-06 PROCEDURE — 87491 CHLMYD TRACH DNA AMP PROBE: CPT | Performed by: NURSE PRACTITIONER

## 2024-05-06 PROCEDURE — 36415 COLL VENOUS BLD VENIPUNCTURE: CPT | Performed by: NURSE PRACTITIONER

## 2024-05-06 PROCEDURE — 86803 HEPATITIS C AB TEST: CPT | Performed by: NURSE PRACTITIONER

## 2024-05-06 PROCEDURE — 87591 N.GONORRHOEAE DNA AMP PROB: CPT | Performed by: NURSE PRACTITIONER

## 2024-05-06 PROCEDURE — 86780 TREPONEMA PALLIDUM: CPT | Performed by: NURSE PRACTITIONER

## 2024-05-06 PROCEDURE — 99214 OFFICE O/P EST MOD 30 MIN: CPT | Performed by: NURSE PRACTITIONER

## 2024-05-06 PROCEDURE — 87389 HIV-1 AG W/HIV-1&-2 AB AG IA: CPT | Performed by: NURSE PRACTITIONER

## 2024-05-06 PROCEDURE — 87340 HEPATITIS B SURFACE AG IA: CPT | Performed by: NURSE PRACTITIONER

## 2024-05-06 RX ORDER — KETOCONAZOLE 20 MG/ML
SHAMPOO TOPICAL
Qty: 120 ML | Refills: 3 | Status: SHIPPED | OUTPATIENT
Start: 2024-05-06

## 2024-05-06 RX ORDER — EPINEPHRINE 0.3 MG/.3ML
0.3 INJECTION SUBCUTANEOUS PRN
Qty: 0.6 ML | Refills: 3 | Status: SHIPPED | OUTPATIENT
Start: 2024-05-06

## 2024-05-06 ASSESSMENT — PAIN SCALES - GENERAL: PAINLEVEL: NO PAIN (0)

## 2024-05-06 NOTE — LETTER
May 8, 2024    Keshia Orellana  [address] [facility] [address]          Dear ,    We are writing to inform you of your test results.  All of your labs were normal for you.     Please contact the clinic if you have additional questions.  Thank you.       Resulted Orders   HIV Antigen Antibody Combo [[id number]]   Result Value Ref Range    HIV Antigen Antibody Combo Nonreactive Nonreactive      Comment:      Negative HIV-1 p24 antigen and HIV-1/2 antibody screening test results usually indicate the absence of HIV-1 and HIV-2 infection. However, such negative results do not rule-out acute HIV infection.  If acute HIV-1 or HIV-2 infection is suspected, detection of HIV-1 or HIV-2 RNA  is recommended.    Hepatitis B surface antigen [[id number]]   Result Value Ref Range    Hepatitis B Surface Antigen Nonreactive Nonreactive   Hepatitis C antibody [[id number]]   Result Value Ref Range    Hepatitis C Antibody Nonreactive Nonreactive      Comment:      A nonreactive screening test result does not exclude the possibility of exposure to or infection with HCV. Nonreactive screening test results in individuals with prior exposure to HCV may be due to antibody levels below the limit of detection of this assay or lack of reactivity to the HCV antigens used in this assay. Patients with recent HCV infections (<3 months from time of exposure) may have false-negative HCV antibody results due to the time needed for seroconversion (average of 8 to 9 weeks).   Treponema Abs w Reflex to RPR and Titer [[id number]]   Result Value Ref Range    Treponema Antibody Total Nonreactive Nonreactive   Neisseria gonorrhoeae PCR [[id number]]   Result Value Ref Range    Neisseria gonorrhoeae Negative Negative      Comment:      Negative for N. gonorrhoeae rRNA by transcription mediated amplification. A negative result by transcription mediated amplification does not preclude the presence of C. trachomatis infection because results are dependent on  proper and adequate collection, absence of inhibitors and sufficient rRNA to be detected.   Chlamydia trachomatis PCR [[id number]]   Result Value Ref Range    Chlamydia trachomatis Negative Negative      Comment:      A negative result by transcription mediated amplification does not preclude the presence of C. trachomatis infection because results are dependent on proper and adequate collection, absence of inhibitors and sufficient rRNA to be detected.       If you have any questions or concerns, please call the clinic at the number listed above.       Sincerely,      ERON Sanchez CNP

## 2024-05-06 NOTE — PROGRESS NOTES
"  Assessment & Plan     (B36.0) Tinea versicolor  (primary encounter diagnosis)  Comment: improving with treatment.  Plan: ketoconazole (NIZORAL) 2 % external shampoo  _ she will schedule a return visit in 4 weeks to assess the resolution of tinea versicolor and discuss the results of the STD screening.      (Z11.3) Screening for STDs (sexually transmitted diseases)  Comment: tests pending.  Plan: REVIEW OF HEALTH MAINTENANCE PROTOCOL ORDERS,         HIV Antigen Antibody Combo [HUC5818], Hepatitis        B surface antigen [KJN013], Hepatitis C         antibody [XIU671], Treponema Abs w Reflex to         RPR and Titer [EVL3303], Neisseria gonorrhoeae         PCR [HXE1089], Chlamydia trachomatis PCR         [FIC132], CANCELED: Chlamydia         trachomatis/Neisseria gonorrhoeae by PCR-         VAGINAL SELF-SWAB    (L50.2) Urticaria due to cold  Comment: stable. Medication refills given.   Plan: EPINEPHrine (ANY BX GENERIC EQUIV) 0.3 MG/0.3ML        injection 2-pack               BMI  Estimated body mass index is 28.16 kg/m  as calculated from the following:    Height as of this encounter: 1.651 m (5' 5\").    Weight as of this encounter: 76.7 kg (169 lb 3.2 oz).   Weight management plan: Discussed healthy diet and exercise guidelines          Josh Schmitt is a 24 year old, presenting for the following health issues:  Derm Problem        5/6/2024     2:13 PM   Additional Questions   Roomed by Sara Wolfe MA   Accompanied by Daughter        Keshia Orellana is a 24-year-old patient with a history of tinea versicolor presented for a follow-up visit. The patient reports significant improvement in symptoms since using ketoconazole 2% shampoo as prescribed. The patient denies any current itching and states that the skin symptoms are improving. The patient also requests STD screening during this visit. No new symptoms or concerns were reported. The patient has been compliant with the treatment regimen, including the " "use of Diflucan (fluconazole) last month.              Review of Systems    All systems negative except as detailed in HPI.       Objective    BP 94/63 (BP Location: Right arm, Patient Position: Chair, Cuff Size: Adult Regular)   Pulse 93   Temp 99.1  F (37.3  C) (Oral)   Resp 18   Ht 1.651 m (5' 5\")   Wt 76.7 kg (169 lb 3.2 oz)   LMP 04/16/2024   SpO2 100%   BMI 28.16 kg/m    Body mass index is 28.16 kg/m .  Physical Exam   GENERAL: alert and no distress  EYES: Eyes grossly normal to inspection, PERRL and conjunctivae and sclerae normal  HENT: ear canals and TM's normal, nose and mouth without ulcers or lesions  NECK: no adenopathy, no asymmetry, masses, or scars  RESP: lungs clear to auscultation - no rales, rhonchi or wheezes  CV: regular rate and rhythm, normal S1 S2, no S3 or S4, no murmur, click or rub, no peripheral edema  ABDOMEN: soft, nontender, no hepatosplenomegaly, no masses and bowel sounds normal  MS: no gross musculoskeletal defects noted, no edema  SKIN:  Examination of the skin shows hypopigmented patches consistent with resolving tinea versicolor, no active lesions noted.  NEURO: Normal strength and tone, mentation intact and speech normal  PSYCH: mentation appears normal, affect normal/bright            Signed Electronically by: ERON Sanchez CNP  Note reviewed only, patient NOT seen by me.  Cherie Chase MD      "

## 2024-05-07 ENCOUNTER — TELEPHONE (OUTPATIENT)
Dept: FAMILY MEDICINE | Facility: CLINIC | Age: 24
End: 2024-05-07
Payer: MEDICAID

## 2024-05-07 LAB
C TRACH DNA SPEC QL NAA+PROBE: NEGATIVE
HBV SURFACE AG SERPL QL IA: NONREACTIVE
HCV AB SERPL QL IA: NONREACTIVE
HIV 1+2 AB+HIV1 P24 AG SERPL QL IA: NONREACTIVE
N GONORRHOEA DNA SPEC QL NAA+PROBE: NEGATIVE
T PALLIDUM AB SER QL: NONREACTIVE

## 2024-05-07 NOTE — TELEPHONE ENCOUNTER
Spoke with patent. Patient explains she had been informed by pharmacy that they are actually in-stock of medication and will be able to fill this.    Advised if any further needs to reach out. Patient verbalized understanding and has no further questions at this time.     YUN RussoN RN  [facility]

## 2024-05-07 NOTE — TELEPHONE ENCOUNTER
Medication Question or Refill    Contacts         Type Contact Phone/Fax    05/07/2024 03:34 PM CDT Phone (Incoming) Keshia Orellana (Self) [phone] (M)            What medication are you calling about (include dose and sig)?: Ketoconazole     Preferred Pharmacy:    [facility]/pharmacy #[id number] - [place], [place] - [address]  [address]  [facility] [address]  Phone: [phone] Fax: [phone]      Controlled Substance Agreement on file:   CSA -- Patient Level:    CSA: None found at the patient level.       Who prescribed the medication?: PCP    Do you need a refill? Yes    When did you use the medication last? 05/05/24    Patient offered an appointment? No    Do you have any questions or concerns?  No      Could we send this information to you in [facility] or would you prefer to receive a phone call?:   Patient would prefer a phone call   Okay to leave a detailed message?: Yes at Cell number on file:    Telephone Information:   Mobile [phone]

## 2024-10-09 ENCOUNTER — TELEPHONE (OUTPATIENT)
Dept: FAMILY MEDICINE | Facility: CLINIC | Age: 24
End: 2024-10-09

## 2024-10-09 NOTE — TELEPHONE ENCOUNTER
Patient Quality Outreach    Patient is due for the following:   Cervical Cancer Screening - PAP Needed  Physical Preventive Adult Physical    Next Steps:   Patient has upcoming appointment, these items will be addressed at that time.    Type of outreach:    Chart review performed, no outreach needed.    Next Steps:  Reach out within 90 days via [employer].    Max number of attempts reached: No. Will try again in 90 days if patient still on fail list.    Questions for provider review:    None           Charanjit Lala MA

## 2024-10-18 ENCOUNTER — OFFICE VISIT (OUTPATIENT)
Dept: FAMILY MEDICINE | Facility: CLINIC | Age: 24
End: 2024-10-18

## 2024-10-18 VITALS
SYSTOLIC BLOOD PRESSURE: 112 MMHG | OXYGEN SATURATION: 97 % | RESPIRATION RATE: 18 BRPM | BODY MASS INDEX: 28.86 KG/M2 | DIASTOLIC BLOOD PRESSURE: 75 MMHG | HEIGHT: 65 IN | TEMPERATURE: 97.3 F | WEIGHT: 173.2 LBS | HEART RATE: 77 BPM

## 2024-10-18 DIAGNOSIS — B36.0 TINEA VERSICOLOR: ICD-10-CM

## 2024-10-18 DIAGNOSIS — M54.42 CHRONIC LEFT-SIDED LOW BACK PAIN WITH LEFT-SIDED SCIATICA: Primary | ICD-10-CM

## 2024-10-18 DIAGNOSIS — G89.29 CHRONIC LEFT-SIDED LOW BACK PAIN WITH LEFT-SIDED SCIATICA: Primary | ICD-10-CM

## 2024-10-18 PROCEDURE — 99214 OFFICE O/P EST MOD 30 MIN: CPT | Performed by: NURSE PRACTITIONER

## 2024-10-18 RX ORDER — CELECOXIB 100 MG/1
100 CAPSULE ORAL 2 TIMES DAILY
Qty: 20 CAPSULE | Refills: 0 | Status: SHIPPED | OUTPATIENT
Start: 2024-10-18

## 2024-10-18 RX ORDER — CYCLOBENZAPRINE HCL 10 MG
10 TABLET ORAL 3 TIMES DAILY PRN
Qty: 21 TABLET | Refills: 0 | Status: SHIPPED | OUTPATIENT
Start: 2024-10-18

## 2024-10-18 RX ORDER — KETOCONAZOLE 20 MG/ML
SHAMPOO, SUSPENSION TOPICAL
Qty: 120 ML | Refills: 3 | Status: SHIPPED | OUTPATIENT
Start: 2024-10-18

## 2024-10-18 ASSESSMENT — PAIN SCALES - GENERAL: PAINLEVEL: NO PAIN (0)

## 2024-10-18 NOTE — PROGRESS NOTES
Assessment & Plan     (M54.42,  G89.29) Chronic left-sided low back pain with left-sided sciatica  (primary encounter diagnosis)  Comment: likely musculoskeletal in origin, with a history of prior injury.  Plan: cyclobenzaprine (FLEXERIL) 10 MG tablet,         celecoxib (CELEBREX) 100 MG capsule, Physical         Therapy  Referral  - Educated the patient on proper body mechanics and ergonomics to prevent exacerbation of symptoms.  -She was encouraged to schedule a follow-up appointment in 4-6 weeks to assess progress and -If symptoms worsen or do not improve, we will consider imaging studies (e.g., X-ray or MRI) to rule out any underlying structural issues.    (B36.0) Tinea versicolor  Comment:  currently stable with some residual patches.  Plan: ketoconazole (NIZORAL) 2 % external shampoo   - Advises the patient to continue monitoring the affected area and to return if there are any signs of worsening or new lesions.  - Discussed the importance of sun protection, as tinea versicolor can be exacerbated by sun exposure.                Josh Schmitt is a 24 year old, presenting for the following health issues:  Derm Problem (Patient is here to follow up on skin condition on her hands. Patient states her back is hurting as well.)      10/18/2024     1:12 PM   Additional Questions   Roomed by Sara Wolfe MA   Accompanied by Self     History of Present Illness       Back Pain:  She presents for follow up of back pain. Patient's back pain is a chronic problem.  Location of back pain:  Left lower back, left buttock, left hip and left side of waist  Description of back pain: burning, cramping, fullness, sharp, shooting and stabbing  Back pain spreads: left buttocks, left thigh, left knee and left foot    Since patient first noticed back pain, pain is: gradually worsening  Does back pain interfere with her job:  Yes       Reason for visit:  Back pain and revisit for skin care    She eats 0-1 servings of  "fruits and vegetables daily.She consumes 0 sweetened beverage(s) daily.She exercises with enough effort to increase her heart rate 60 or more minutes per day.  She exercises with enough effort to increase her heart rate 7 days per week.   She is taking medications regularly.     Keshia Orellana is a 24-year-old female presents with a chief complaint of low back pain that has persisted for one year. The patient reports a history of injury at the age of 11, which she believes may be related to her current symptoms. The pain is localized to the lower left back and radiates down to the left lower extremities. She notes that the pain is not exacerbated by activities such as bending, twisting, or lifting. The patient denies experiencing fever, bowel or bladder incontinence, neurologic deficits, or saddle anesthesia, which may suggest a more serious underlying condition. Additionally, she has no history of steroid use, malignancy, or osteomyelitis.    The patient also has a history of tinea versicolor, which she reports has significantly improved since her last visit; however, she still has some patches remaining on the left lower abdomen. She mentions that she has run out of her 2% external shampoo and denies any associated itching.                Review of Systems  Constitutional, HEENT, cardiovascular, pulmonary, GI, , musculoskeletal, neuro, skin, endocrine and psych systems are negative, except as otherwise noted.      Objective    /75 (BP Location: Left arm, Patient Position: Chair, Cuff Size: Adult Regular)   Pulse 77   Temp 97.3  F (36.3  C) (Temporal)   Resp 18   Ht 1.651 m (5' 5\")   Wt 78.6 kg (173 lb 3.2 oz)   LMP 10/14/2024 (Approximate)   SpO2 97%   BMI 28.82 kg/m    Body mass index is 28.82 kg/m .  Physical Exam  Constitutional:       Appearance: Normal appearance.   HENT:      Head: Normocephalic and atraumatic.      Nose: Nose normal.      Mouth/Throat:      Mouth: Mucous membranes are moist. "   Eyes:      Extraocular Movements: Extraocular movements intact.   Cardiovascular:      Rate and Rhythm: Normal rate and regular rhythm.      Pulses: Normal pulses.      Heart sounds: Normal heart sounds. No murmur heard.  Pulmonary:      Effort: Pulmonary effort is normal.      Breath sounds: Normal breath sounds.   Abdominal:      General: Bowel sounds are normal.      Palpations: Abdomen is soft.   Musculoskeletal:      Cervical back: Normal range of motion and neck supple.      Lumbar back: Spasms, tenderness and bony tenderness present. Decreased range of motion. Positive left straight leg raise test. Negative right straight leg raise test.      Right lower leg: No edema.      Left lower leg: No edema.      Comments:  No visible deformities or swelling.    Skin:     Capillary Refill: Capillary refill takes less than 2 seconds.             Comments: Tinea versicolor patches noted.No signs of inflammation or secondary infection.   Neurological:      Mental Status: She is alert and oriented to person, place, and time.      Comments: Sensation intact in lower extremities. No motor deficits observed. Reflexes are normal.   Psychiatric:         Mood and Affect: Mood normal.         Behavior: Behavior normal.         Thought Content: Thought content normal.         Judgment: Judgment normal.                    Signed Electronically by: ERON Sanchez CNP

## 2024-11-14 ENCOUNTER — OFFICE VISIT (OUTPATIENT)
Dept: FAMILY MEDICINE | Facility: CLINIC | Age: 24
End: 2024-11-14
Payer: MEDICAID

## 2024-11-14 VITALS
HEART RATE: 85 BPM | BODY MASS INDEX: 28.96 KG/M2 | DIASTOLIC BLOOD PRESSURE: 64 MMHG | SYSTOLIC BLOOD PRESSURE: 98 MMHG | OXYGEN SATURATION: 98 % | RESPIRATION RATE: 20 BRPM | WEIGHT: 174 LBS | TEMPERATURE: 98.8 F

## 2024-11-14 DIAGNOSIS — D17.30 LIPOMA OF SKIN AND SUBCUTANEOUS TISSUE: Primary | ICD-10-CM

## 2024-11-14 PROCEDURE — 99213 OFFICE O/P EST LOW 20 MIN: CPT | Performed by: NURSE PRACTITIONER

## 2024-11-14 ASSESSMENT — PAIN SCALES - GENERAL: PAINLEVEL_OUTOF10: MODERATE PAIN (5)

## 2024-11-14 NOTE — PROGRESS NOTES
"  Assessment & Plan     (D17.30) Lipoma of skin and subcutaneous tissue  (primary encounter diagnosis)  Comment:differential diagnosis include Sebaceous cyst   Plan: Adult Dermatology  Referral for evaluation and management of the mass on the back of the left ear.   - Discussed the nature of the mass with the patient, including the likelihood of it being a sebaceous cyst or lipoma.   - Avoid manipulating or squeezing the mass to prevent infection or inflammation.  - Keep the area clean and dry.  - Monitor for any changes in size, color, or symptoms such as pain or drainage.           BMI  Estimated body mass index is 28.96 kg/m  as calculated from the following:    Height as of 10/18/24: 1.651 m (5' 5\").    Weight as of this encounter: 78.9 kg (174 lb).             Subjective   Keshia is a 24 year old, presenting for the following health issues:  Derm Problem (Behind ear)      11/14/2024     3:57 PM   Additional Questions   Roomed by Crystal   Accompanied by self         11/14/2024     3:57 PM   Patient Reported Additional Medications   Patient reports taking the following new medications none     History of Present Illness       Reason for visit:  Cyst behind my ear  Symptom onset:  1-2 weeks ago   She is taking medications regularly.     Keshia Orellana is a 24-year-old female who presents with a painless mass on the back of her left ear, which has been present for the past three weeks. She reports that the mass has not increased in size since its onset. The patient expresses a desire to have the mass removed. She denies any associated symptoms, including pain, swelling, warmth, drainage, or redness in the area surrounding the mass.               Review of Systems  Constitutional, HEENT, cardiovascular, pulmonary, GI, , musculoskeletal, neuro, skin, endocrine and psych systems are negative, except as otherwise noted.      Objective    LMP 10/14/2024 (Approximate)   There is no height or weight on file to " calculate BMI.  Physical Exam   GENERAL: alert and no distress  NECK: no adenopathy, no asymmetry, masses, or scars  RESP: lungs clear to auscultation - no rales, rhonchi or wheezes  CV: regular rate and rhythm, normal S1 S2, no S3 or S4, no murmur, click or rub, no peripheral edema  ABDOMEN: soft, nontender, no hepatosplenomegaly, no masses and bowel sounds normal  MS: no gross musculoskeletal defects noted, no edema  SKIN: Examination reveals a well-defined, non-tender mass approximately 1.7 cm in diameter located on the posterior aspect of the left ear. The overlying skin appears normal without erythema or drainage.   NEURO: Normal strength and tone, mentation intact and speech normal  PSYCH: mentation appears normal, affect normal/bright            Signed Electronically by: ERON Sanchez CNP

## 2024-11-14 NOTE — PATIENT INSTRUCTIONS
"Patient Education   Lipoma: Care Instructions  Your Care Instructions  A lipoma is a growth of fat just below the skin. It may feel soft and rubbery. Lipomas can occur anywhere on the body. But they are most common on the torso, neck, upper thighs, upper arms, and armpits. A lipoma does not turn into cancer.  Lipomas usually are not treated, because most of them don't hurt or cause problems. But your doctor may remove a lipoma if it is painful, gets infected, or bothers you.  Follow-up care is a key part of your treatment and safety. Be sure to make and go to all appointments, and call your doctor if you are having problems. It's also a good idea to know your test results and keep a list of the medicines you take.  How can you care for yourself at home?  A lipoma usually needs no care at home unless your doctor made a cut (incision) to remove it.  If your doctor told you how to care for your incision, follow your doctor's instructions. If you did not get instructions, follow this general advice:  Wash around the incision with clean water 2 times a day. Don't use hydrogen peroxide or alcohol. These can slow healing.  You may cover the incision with a thin layer of petroleum jelly, such as Vaseline, and a nonstick bandage.  Apply more petroleum jelly and replace the bandage as needed.  When should you call for help?   Call your doctor now or seek immediate medical care if:    You have signs of infection, such as:  Increased pain, swelling, warmth, or redness.  Red streaks leading from the lipoma.  Pus draining from the lipoma.  A fever.   Watch closely for changes in your health, and be sure to contact your doctor if:    The lipoma is growing or changing.     You do not get better as expected.   Where can you learn more?  Go to https://www.healthwise.net/patiented  Enter J054 in the search box to learn more about \"Lipoma: Care Instructions.\"  Current as of: November 16, 2023  Content Version: 14.2 2024 [employer].   Care instructions adapted under license by your healthcare professional. If you have questions about a medical condition or this instruction, always ask your healthcare professional. [employer] disclaims any warranty or liability for your use of this information.

## 2024-11-24 ENCOUNTER — HEALTH MAINTENANCE LETTER (OUTPATIENT)
Age: 24
End: 2024-11-24

## 2024-12-03 ENCOUNTER — OFFICE VISIT (OUTPATIENT)
Dept: FAMILY MEDICINE | Facility: CLINIC | Age: 24
End: 2024-12-03
Payer: COMMERCIAL

## 2024-12-03 VITALS
BODY MASS INDEX: 28.79 KG/M2 | RESPIRATION RATE: 14 BRPM | WEIGHT: 173 LBS | OXYGEN SATURATION: 99 % | SYSTOLIC BLOOD PRESSURE: 110 MMHG | DIASTOLIC BLOOD PRESSURE: 75 MMHG | TEMPERATURE: 99.1 F | HEART RATE: 81 BPM

## 2024-12-03 DIAGNOSIS — B36.0 TINEA VERSICOLOR: ICD-10-CM

## 2024-12-03 DIAGNOSIS — L08.9 LOCAL INFECTION OF SKIN AND SUBCUTANEOUS TISSUE: ICD-10-CM

## 2024-12-03 DIAGNOSIS — D17.30 LIPOMA OF SKIN AND SUBCUTANEOUS TISSUE: Primary | ICD-10-CM

## 2024-12-03 PROCEDURE — 99213 OFFICE O/P EST LOW 20 MIN: CPT | Performed by: NURSE PRACTITIONER

## 2024-12-03 RX ORDER — MUPIROCIN 20 MG/G
OINTMENT TOPICAL 3 TIMES DAILY
Qty: 15 G | Refills: 0 | Status: SHIPPED | OUTPATIENT
Start: 2024-12-03

## 2024-12-03 RX ORDER — KETOCONAZOLE 20 MG/ML
SHAMPOO, SUSPENSION TOPICAL
Qty: 120 ML | Refills: 3 | Status: SHIPPED | OUTPATIENT
Start: 2024-12-03

## 2024-12-03 NOTE — PROGRESS NOTES
"  Assessment & Plan     (D17.30) Lipoma of skin and subcutaneous tissue  (primary encounter diagnosis)  Comment: possible cyst or lipoma, ruptured and draining, warm pack 3x daily     (L08.9) Local infection of skin and subcutaneous tissue  Comment: yellow discharge from lesion   Plan: mupirocin (BACTROBAN) 2 % external ointment 3x daily    (B36.0) Tinea versicolor  Comment: improving with treatment. Needs refill today  Plan: ketoconazole (NIZORAL) 2 % external shampoo            BMI  Estimated body mass index is 28.79 kg/m  as calculated from the following:    Height as of 10/18/24: 1.651 m (5' 5\").    Weight as of this encounter: 78.5 kg (173 lb).         Patient Instructions   Please warm pack your cyst 3x daily     Please use bactroban 3x daily     Josh Schmitt is a 24 year old, presenting for the following health issues:  No chief complaint on file.      12/3/2024     1:57 PM   Additional Questions   Roomed by guillermo     History of Present Illness       Reason for visit:  Lipoma    She eats 0-1 servings of fruits and vegetables daily.She consumes 0 sweetened beverage(s) daily.She exercises with enough effort to increase her heart rate 60 or more minutes per day.  She exercises with enough effort to increase her heart rate 7 days per week.   She is taking medications regularly.         Skin Lesion  Onset/Duration: dx with lipoma or possible cyst, ruptured recently and is now draining   Description  Location: behind left ear  Color: brown  Border description: sharp border, raised, scaly  Character: round  Itching: moderate  Bleeding:  YES- serosanguinous drainage   Intensity:  mild  Progression of Symptoms:  intermittent  Accompanying signs and symptoms:   Bleeding: YES  Scaling: YES  Excessive sun exposure/tanning: No  Sunscreen used: No  History:           Any previous history of skin cancer: No  Any family history of melanoma: No  Previous episodes of similar lesion: YES  Precipitating or alleviating " factors: none   Therapies tried and outcome: nothing currently         Review of Systems  Constitutional, HEENT, cardiovascular, pulmonary, gi and gu systems are negative, except as otherwise noted.      Objective    /75   Pulse 81   Temp 99.1  F (37.3  C)   Resp 14   Wt 173 lb (78.5 kg)   LMP 11/11/2024 (Exact Date)   SpO2 99%   BMI 28.79 kg/m    Body mass index is 28.79 kg/m .  Physical Exam   GENERAL: alert and no distress  NECK: no adenopathy, no asymmetry, masses, or scars  RESP: lungs clear to auscultation - no rales, rhonchi or wheezes  CV: regular rate and rhythm, normal S1 S2, no S3 or S4, no murmur, click or rub, no peripheral edema  MS: no gross musculoskeletal defects noted, no edema  SKIN: behind left ear, firm mass, tender to palpation, dry, scaly, small brake in skin integrity, scant drainage, yellow to brown  PSYCH: mentation appears normal, affect normal/bright            Signed Electronically by: Rox Rose, NP, APRN CNP

## 2025-03-19 ENCOUNTER — OFFICE VISIT (OUTPATIENT)
Dept: FAMILY MEDICINE | Facility: CLINIC | Age: 25
End: 2025-03-19
Payer: COMMERCIAL

## 2025-03-19 VITALS
BODY MASS INDEX: 28.29 KG/M2 | RESPIRATION RATE: 20 BRPM | DIASTOLIC BLOOD PRESSURE: 62 MMHG | OXYGEN SATURATION: 99 % | SYSTOLIC BLOOD PRESSURE: 100 MMHG | TEMPERATURE: 98.6 F | HEART RATE: 103 BPM | WEIGHT: 170 LBS

## 2025-03-19 DIAGNOSIS — S76.011A MUSCLE STRAIN OF GLUTEAL REGION, RIGHT, INITIAL ENCOUNTER: Primary | ICD-10-CM

## 2025-03-19 DIAGNOSIS — M54.42 CHRONIC LEFT-SIDED LOW BACK PAIN WITH LEFT-SIDED SCIATICA: ICD-10-CM

## 2025-03-19 DIAGNOSIS — G89.29 CHRONIC LEFT-SIDED LOW BACK PAIN WITH LEFT-SIDED SCIATICA: ICD-10-CM

## 2025-03-19 PROCEDURE — 3078F DIAST BP <80 MM HG: CPT | Performed by: NURSE PRACTITIONER

## 2025-03-19 PROCEDURE — 99213 OFFICE O/P EST LOW 20 MIN: CPT | Performed by: NURSE PRACTITIONER

## 2025-03-19 PROCEDURE — 1125F AMNT PAIN NOTED PAIN PRSNT: CPT | Performed by: NURSE PRACTITIONER

## 2025-03-19 PROCEDURE — 3074F SYST BP LT 130 MM HG: CPT | Performed by: NURSE PRACTITIONER

## 2025-03-19 ASSESSMENT — ENCOUNTER SYMPTOMS
FEVER: 0
CHILLS: 0
COUGH: 0

## 2025-03-19 ASSESSMENT — PAIN SCALES - GENERAL: PAINLEVEL_OUTOF10: SEVERE PAIN (8)

## 2025-03-19 NOTE — LETTER
March 19, 2025      Keshia WISE Reyna  [address] [facility] [address]        To Whom It May Concern:    Keshia Orellana  was seen on 03/19/25.  Please excuse her  until 4/2/25 due to injury.        Sincerely,        ERON Dougherty CNP    Electronically signed

## 2025-07-09 ENCOUNTER — TELEPHONE (OUTPATIENT)
Dept: DERMATOLOGY | Facility: CLINIC | Age: 25
End: 2025-07-09